# Patient Record
Sex: FEMALE | Race: WHITE
[De-identification: names, ages, dates, MRNs, and addresses within clinical notes are randomized per-mention and may not be internally consistent; named-entity substitution may affect disease eponyms.]

---

## 2018-11-30 ENCOUNTER — HOSPITAL ENCOUNTER (EMERGENCY)
Dept: HOSPITAL 92 - ERS | Age: 43
Discharge: HOME | End: 2018-11-30
Payer: SELF-PAY

## 2018-11-30 DIAGNOSIS — I10: ICD-10-CM

## 2018-11-30 DIAGNOSIS — J06.9: Primary | ICD-10-CM

## 2018-11-30 LAB — SP GR UR STRIP: 1.04 (ref 1–1.04)

## 2018-11-30 PROCEDURE — 71046 X-RAY EXAM CHEST 2 VIEWS: CPT

## 2018-11-30 PROCEDURE — 81003 URINALYSIS AUTO W/O SCOPE: CPT

## 2018-11-30 NOTE — RAD
2 VIEWS CHEST:

 

Date:  11/30/18 

 

COMPARISON:  

None. 

 

HISTORY:  

Cold symptoms, cough, malaise. 

 

FINDINGS:

There is a prominent hiatal hernia, incompletely assessed on this exam. There is no pneumothorax, ple
ural fluid, focal consolidation, or alveolar edema. 

 

IMPRESSION: 

No acute findings.  Hiatal hernia noted. 

 

 

POS: [facility]